# Patient Record
Sex: FEMALE | Race: BLACK OR AFRICAN AMERICAN | NOT HISPANIC OR LATINO | URBAN - METROPOLITAN AREA
[De-identification: names, ages, dates, MRNs, and addresses within clinical notes are randomized per-mention and may not be internally consistent; named-entity substitution may affect disease eponyms.]

---

## 2018-08-17 ENCOUNTER — EMERGENCY (EMERGENCY)
Facility: HOSPITAL | Age: 38
LOS: 0 days | Discharge: HOME | End: 2018-08-17
Attending: EMERGENCY MEDICINE | Admitting: EMERGENCY MEDICINE
Payer: MEDICAID

## 2018-08-17 VITALS
OXYGEN SATURATION: 100 % | SYSTOLIC BLOOD PRESSURE: 122 MMHG | RESPIRATION RATE: 16 BRPM | TEMPERATURE: 98 F | DIASTOLIC BLOOD PRESSURE: 90 MMHG | HEART RATE: 70 BPM

## 2018-08-17 VITALS — HEIGHT: 67 IN | WEIGHT: 125 LBS

## 2018-08-17 DIAGNOSIS — Z76.0 ENCOUNTER FOR ISSUE OF REPEAT PRESCRIPTION: ICD-10-CM

## 2018-08-17 DIAGNOSIS — F41.9 ANXIETY DISORDER, UNSPECIFIED: ICD-10-CM

## 2018-08-17 DIAGNOSIS — F32.9 MAJOR DEPRESSIVE DISORDER, SINGLE EPISODE, UNSPECIFIED: ICD-10-CM

## 2018-08-17 DIAGNOSIS — F25.9 SCHIZOAFFECTIVE DISORDER, UNSPECIFIED: ICD-10-CM

## 2018-08-17 PROCEDURE — 99282 EMERGENCY DEPT VISIT SF MDM: CPT

## 2018-08-17 NOTE — ED STATDOCS - OBJECTIVE STATEMENT
39 y/o female with a PMHx of anxiety, depression, and schizoaffective disorder presents to the ED requesting medication refill of Haldol, unknown dose. Pt states she has not refilled her prescription 4-6 months ago. She typically has her medication filled by her psychiatrist and has never refilled through [facility]. Pt denies any symptoms at this time.

## 2018-08-17 NOTE — ED STATDOCS - ATTENDING CONTRIBUTION TO CARE
I, Jw Lopez, performed the initial face to face bedside interview with this patient regarding history of present illness, review of symptoms and relevant past medical, social and family history.  I completed an independent physical examination.  I was the initial provider who evaluated this patient. I have signed out the follow up of any pending tests (i.e. labs, radiological studies) to the ACP.  I have communicated the patient’s plan of care and disposition with the ACP.  The history, relevant review of systems, past medical and surgical history, medical decision making, and physical examination was documented by the scribe in my presence and I attest to the accuracy of the documentation.

## 2018-08-26 ENCOUNTER — EMERGENCY (EMERGENCY)
Facility: HOSPITAL | Age: 38
LOS: 0 days | Discharge: HOME | End: 2018-08-26
Attending: EMERGENCY MEDICINE | Admitting: EMERGENCY MEDICINE
Payer: MEDICAID

## 2018-08-26 VITALS
RESPIRATION RATE: 16 BRPM | HEART RATE: 60 BPM | DIASTOLIC BLOOD PRESSURE: 85 MMHG | TEMPERATURE: 98 F | SYSTOLIC BLOOD PRESSURE: 126 MMHG | OXYGEN SATURATION: 100 %

## 2018-08-26 VITALS — WEIGHT: 130.07 LBS | HEIGHT: 67 IN

## 2018-08-26 DIAGNOSIS — R21 RASH AND OTHER NONSPECIFIC SKIN ERUPTION: ICD-10-CM

## 2018-08-26 DIAGNOSIS — F25.9 SCHIZOAFFECTIVE DISORDER, UNSPECIFIED: ICD-10-CM

## 2018-08-26 DIAGNOSIS — F32.9 MAJOR DEPRESSIVE DISORDER, SINGLE EPISODE, UNSPECIFIED: ICD-10-CM

## 2018-08-26 DIAGNOSIS — L29.9 PRURITUS, UNSPECIFIED: ICD-10-CM

## 2018-08-26 DIAGNOSIS — F41.9 ANXIETY DISORDER, UNSPECIFIED: ICD-10-CM

## 2018-08-26 PROCEDURE — 99283 EMERGENCY DEPT VISIT LOW MDM: CPT

## 2018-08-26 RX ADMIN — Medication 60 MILLIGRAM(S): at 15:32

## 2018-08-26 NOTE — ED STATDOCS - PROGRESS NOTE DETAILS
signed Rhea Ram PA-C Pt seen initially in intake by Dr. Butler. signed Rhea Ram PA-C Pt seen initially in intake by Dr. Butler.  Pt c/o itchy rash from mosquito bites or poison ivy on her abdomen and back. Pt states she has had this for about 1 month, notes she had gone to urinate in a [place]ed area near a supermarket and fell into some plants that may have been poison ivy, though this was after her symptoms had already started. No travel or sick contacts. Pt has scarce scattered small erythametous macules on trunk. No vesicles, petichiae, or crusting. Rash of unclear etiology. recommend outpt f/u derm. rx medrol dosepak. Pt feeling well, agrees with DC and plan of care.

## 2018-08-26 NOTE — ED ADULT NURSE NOTE - NSIMPLEMENTINTERV_GEN_ALL_ED
Implemented All Universal Safety Interventions:  [place] to call system. Call bell, personal items and telephone within reach. Instruct patient to call for assistance. Room bathroom lighting operational. Non-slip footwear when patient is off stretcher. Physically safe environment: no spills, clutter or unnecessary equipment. Stretcher in lowest position, wheels locked, appropriate side rails in place.

## 2018-08-26 NOTE — ED ADULT TRIAGE NOTE - CHIEF COMPLAINT QUOTE
as per pt " I'm getting bit by mosquitos so I need something for that and I might have some poison ivy so I need something for that as well"

## 2018-08-26 NOTE — ED STATDOCS - OBJECTIVE STATEMENT
39 y/o F with no relevant PMHx presents to the ED c/o rash and itchiness to abd and back. Pt believes they are from mosquitoes bites mixed with poison ivy. Pt notes itchiness to sites. NAD.

## 2018-10-08 ENCOUNTER — EMERGENCY (EMERGENCY)
Facility: HOSPITAL | Age: 38
LOS: 0 days | Discharge: HOME | End: 2018-10-08
Attending: EMERGENCY MEDICINE | Admitting: EMERGENCY MEDICINE
Payer: MEDICAID

## 2018-10-08 VITALS
SYSTOLIC BLOOD PRESSURE: 123 MMHG | HEART RATE: 69 BPM | TEMPERATURE: 99 F | DIASTOLIC BLOOD PRESSURE: 81 MMHG | OXYGEN SATURATION: 97 % | RESPIRATION RATE: 17 BRPM

## 2018-10-08 VITALS — HEIGHT: 67 IN | WEIGHT: 125 LBS

## 2018-10-08 DIAGNOSIS — F41.9 ANXIETY DISORDER, UNSPECIFIED: ICD-10-CM

## 2018-10-08 DIAGNOSIS — R05 COUGH: ICD-10-CM

## 2018-10-08 DIAGNOSIS — R50.9 FEVER, UNSPECIFIED: ICD-10-CM

## 2018-10-08 DIAGNOSIS — F17.200 NICOTINE DEPENDENCE, UNSPECIFIED, UNCOMPLICATED: ICD-10-CM

## 2018-10-08 DIAGNOSIS — J45.901 UNSPECIFIED ASTHMA WITH (ACUTE) EXACERBATION: ICD-10-CM

## 2018-10-08 DIAGNOSIS — F32.9 MAJOR DEPRESSIVE DISORDER, SINGLE EPISODE, UNSPECIFIED: ICD-10-CM

## 2018-10-08 DIAGNOSIS — F25.9 SCHIZOAFFECTIVE DISORDER, UNSPECIFIED: ICD-10-CM

## 2018-10-08 PROCEDURE — 99284 EMERGENCY DEPT VISIT MOD MDM: CPT | Mod: 25

## 2018-10-08 RX ORDER — IPRATROPIUM/ALBUTEROL SULFATE 18-103MCG
3 AEROSOL WITH ADAPTER (GRAM) INHALATION
Qty: 0 | Refills: 0 | Status: COMPLETED | OUTPATIENT
Start: 2018-10-08 | End: 2018-10-08

## 2018-10-08 RX ORDER — ALBUTEROL 90 UG/1
2 AEROSOL, METERED ORAL
Qty: 1 | Refills: 0 | OUTPATIENT
Start: 2018-10-08

## 2018-10-08 RX ORDER — AZITHROMYCIN 500 MG/1
1 TABLET, FILM COATED ORAL
Qty: 4 | Refills: 0 | OUTPATIENT
Start: 2018-10-08 | End: 2018-10-11

## 2018-10-08 RX ORDER — AZITHROMYCIN 500 MG/1
500 TABLET, FILM COATED ORAL ONCE
Qty: 0 | Refills: 0 | Status: COMPLETED | OUTPATIENT
Start: 2018-10-08 | End: 2018-10-08

## 2018-10-08 RX ADMIN — AZITHROMYCIN 500 MILLIGRAM(S): 500 TABLET, FILM COATED ORAL at 15:17

## 2018-10-08 RX ADMIN — Medication 3 MILLILITER(S): at 15:18

## 2018-10-08 RX ADMIN — Medication 3 MILLILITER(S): at 15:05

## 2018-10-08 RX ADMIN — Medication 60 MILLIGRAM(S): at 15:17

## 2018-10-08 RX ADMIN — Medication 3 MILLILITER(S): at 15:00

## 2018-10-08 NOTE — ED STATDOCS - OBJECTIVE STATEMENT
39 y/o female with a PMHx of bronchitis presents to the ED c/o productive cough x1 week. Pt states she has had intermittent subjective fevers. Pt currently does not have an inhaler. Pt Dx with bronchitis. NKDA. Has IUD. No PMD. Smoker.

## 2018-10-08 NOTE — ED STATDOCS - PROGRESS NOTE DETAILS
39 y/o F with PMH of bronchitis, presents with productive cough with green sputum and intermittent subjective fever x 1 week. Has not used any medications at home. States symptoms are similar to when she's had bronchitis in the past. Had inhaler for home use apx 6 months ago, but it has since run out. Admits to being a current smoker. 37 y/o F with PMH of bronchitis, presents with productive cough with green sputum and intermittent subjective fever x 1 week. Has not used any medications at home. States symptoms are similar to when she's had bronchitis in the past. Had inhaler for home use apx 6 months ago, but it has since run out. No recent travel. No sick contacts. No significant unexpected weight loss. No exposure to crowded areas such as hospitals, schools, prisons, nursing homes. Admits to being a current smoker. PE: NAD. Cardiac: s1/s2, RRR. Lungs: mild expiratory wheezing left lung base. No LE edema. A/P: URI, duonebs, prednisone, azithromycin. Likely d/c home. - Jun San PA-C

## 2018-11-01 ENCOUNTER — EMERGENCY (EMERGENCY)
Facility: HOSPITAL | Age: 38
LOS: 0 days | Discharge: HOME | End: 2018-11-01
Attending: EMERGENCY MEDICINE | Admitting: EMERGENCY MEDICINE
Payer: MEDICAID

## 2018-11-01 VITALS
TEMPERATURE: 98 F | DIASTOLIC BLOOD PRESSURE: 91 MMHG | SYSTOLIC BLOOD PRESSURE: 132 MMHG | RESPIRATION RATE: 17 BRPM | HEART RATE: 71 BPM | OXYGEN SATURATION: 98 %

## 2018-11-01 VITALS — WEIGHT: 125 LBS | HEIGHT: 67 IN

## 2018-11-01 DIAGNOSIS — F17.200 NICOTINE DEPENDENCE, UNSPECIFIED, UNCOMPLICATED: ICD-10-CM

## 2018-11-01 DIAGNOSIS — J45.909 UNSPECIFIED ASTHMA, UNCOMPLICATED: ICD-10-CM

## 2018-11-01 DIAGNOSIS — F32.9 MAJOR DEPRESSIVE DISORDER, SINGLE EPISODE, UNSPECIFIED: ICD-10-CM

## 2018-11-01 DIAGNOSIS — Z76.0 ENCOUNTER FOR ISSUE OF REPEAT PRESCRIPTION: ICD-10-CM

## 2018-11-01 DIAGNOSIS — Z79.51 LONG TERM (CURRENT) USE OF INHALED STEROIDS: ICD-10-CM

## 2018-11-01 DIAGNOSIS — F25.9 SCHIZOAFFECTIVE DISORDER, UNSPECIFIED: ICD-10-CM

## 2018-11-01 DIAGNOSIS — F41.9 ANXIETY DISORDER, UNSPECIFIED: ICD-10-CM

## 2018-11-01 PROCEDURE — 99283 EMERGENCY DEPT VISIT LOW MDM: CPT

## 2018-11-01 RX ORDER — ALBUTEROL 90 UG/1
2 AEROSOL, METERED ORAL
Qty: 1 | Refills: 0 | OUTPATIENT
Start: 2018-11-01 | End: 2018-11-07

## 2018-11-01 NOTE — ED ADULT NURSE NOTE - CHPI ED NUR SYMPTOMS NEG
no fever/no pain/no nausea/no dizziness/no weakness/no chills/no decreased eating/drinking/no tingling/no vomiting

## 2018-11-01 NOTE — ED STATDOCS - PROGRESS NOTE DETAILS
[place] VINCENT for ED attending, Dr. Hammer: Offered pt nebulizer treatment. Pt declines. Discussed with pt, can get flu shot at Pharmacy. 38 yr. old female PMH: Asthma presents to ED with chest tightness and running out of Albuterol Inhaler. Seen and examined by attending in intake. Plan: Pro Air Inhaler and Rx. Prednisone. Will F/U with results and re evaluate. Ariana ESTRELLA [place] [facility]ENT for ED attending, Dr. Hammer: Offered pt nebulizer treatment and steroids. Pt declines. Discussed with pt, can get flu shot at Pharmacy.

## 2018-11-01 NOTE — ED ADULT NURSE NOTE - NSIMPLEMENTINTERV_GEN_ALL_ED
Implemented All Universal Safety Interventions:  [place] to call system. Call bell, personal items and telephone within reach. Instruct patient to call for assistance. Room bathroom lighting operational. Non-slip footwear when patient is off stretcher. Physically safe environment: no spills, clutter or unnecessary equipment. Stretcher in lowest position, wheels locked, appropriate side rails in place.

## 2018-11-01 NOTE — ED STATDOCS - OBJECTIVE STATEMENT
39 y/o F with PMHx of Anxiety, Depression, Schizoaffective Disorder, and Asthma presenting to the ED for refill of Albuterol inhaler and to receive flu shot. +chest tightness. States that her Albuterol inhaler prescription is almost done and she needs a refill. Hx of hospitalization secondary to asthma, no hx of intubation. Denies any SOB, fevers, chills, abd pain, N/V/D. Pharmacy: [facility]. Smoker, 0.5ppd. NKDA.

## 2018-11-01 NOTE — ED STATDOCS - ATTENDING CONTRIBUTION TO CARE
I, Mana Hammer MD,  performed the initial face to face bedside interview with this patient regarding history of present illness, review of symptoms and relevant past medical, social and family history.  I completed an independent physical examination.  I was the initial provider who evaluated this patient. I have signed out the follow up of any pending tests (i.e. labs, radiological studies) to the ACP.  I have communicated the patient’s plan of care and disposition with the ACP.  The history, relevant review of systems, past medical and surgical history, medical decision making, and physical examination was documented by the scribe in my presence and I attest to the accuracy of the documentation.

## 2018-11-01 NOTE — ED ADULT NURSE NOTE - OBJECTIVE STATEMENT
presents to ed needing renewal for albuterol prescription. patient currently not in any distress. currently comfortable. will continue to monitor for safety and comfort.

## 2019-01-30 ENCOUNTER — EMERGENCY (EMERGENCY)
Facility: HOSPITAL | Age: 39
LOS: 0 days | Discharge: HOME | End: 2019-01-30
Attending: STUDENT IN AN ORGANIZED HEALTH CARE EDUCATION/TRAINING PROGRAM | Admitting: STUDENT IN AN ORGANIZED HEALTH CARE EDUCATION/TRAINING PROGRAM
Payer: MEDICAID

## 2019-01-30 VITALS — HEIGHT: 67 IN

## 2019-01-30 VITALS
DIASTOLIC BLOOD PRESSURE: 93 MMHG | OXYGEN SATURATION: 100 % | HEART RATE: 74 BPM | RESPIRATION RATE: 16 BRPM | SYSTOLIC BLOOD PRESSURE: 133 MMHG | WEIGHT: 138.01 LBS | HEIGHT: 67 IN | TEMPERATURE: 99 F

## 2019-01-30 DIAGNOSIS — L02.412 CUTANEOUS ABSCESS OF LEFT AXILLA: ICD-10-CM

## 2019-01-30 DIAGNOSIS — L02.413 CUTANEOUS ABSCESS OF RIGHT UPPER LIMB: ICD-10-CM

## 2019-01-30 DIAGNOSIS — L02.433: ICD-10-CM

## 2019-01-30 DIAGNOSIS — L02.422 FURUNCLE OF LEFT AXILLA: ICD-10-CM

## 2019-01-30 PROCEDURE — 99284 EMERGENCY DEPT VISIT MOD MDM: CPT

## 2019-01-30 RX ORDER — CEPHALEXIN 500 MG
500 CAPSULE ORAL ONCE
Qty: 0 | Refills: 0 | Status: COMPLETED | OUTPATIENT
Start: 2019-01-30 | End: 2019-01-30

## 2019-01-30 RX ORDER — CEPHALEXIN 500 MG
1 CAPSULE ORAL
Qty: 28 | Refills: 0 | OUTPATIENT
Start: 2019-01-30 | End: 2019-02-05

## 2019-01-30 RX ORDER — AZTREONAM 2 G
1 VIAL (EA) INJECTION
Qty: 14 | Refills: 0 | OUTPATIENT
Start: 2019-01-30 | End: 2019-02-05

## 2019-01-30 RX ADMIN — Medication 1 TABLET(S): at 15:40

## 2019-01-30 RX ADMIN — Medication 500 MILLIGRAM(S): at 15:40

## 2019-01-30 NOTE — ED STATDOCS - OBJECTIVE STATEMENT
37 y/o female with a PMHx of anxiety, depression presents to the ED c/o right forearm, left axilla abscesses for a few days. No fever, chills or any other acute complaints at this time. Pharmacy: [facility] in [place]. KEVIN.

## 2019-01-30 NOTE — ED ADULT NURSE NOTE - CHIEF COMPLAINT QUOTE
patient reports boil on r forearm and bilateral axilla. c/o discomfort at site.   was seen in [facility] 1/2-1/14/2019  evaluated for stds negative.  denies fever or chills

## 2019-01-30 NOTE — ED STATDOCS - SKIN, MLM
+1cm abscess to the dorsum of the right wrist and left axilla. No surrounding erythema, no warmth, non fluctuant.

## 2019-01-30 NOTE — ED ADULT TRIAGE NOTE - CHIEF COMPLAINT QUOTE
patient reports boil on r forearm and bilateral axilla. c/o discomfort at site.   was seen in [facility] 1/2-1/14/2019  evaluated for stds negative.  denies fever or chills

## 2019-01-30 NOTE — ED STATDOCS - ATTENDING CONTRIBUTION TO CARE
I, Justine Tristan DO,  performed the initial face to face bedside interview with this patient regarding history of present illness, review of symptoms and relevant past medical, social and family history.  I completed an independent physical examination.  I was the initial provider who evaluated this patient. I have signed out the follow up of any pending tests (i.e. labs, radiological studies) to the ACP.  I have communicated the patient’s plan of care and disposition with the ACP.  The history, relevant review of systems, past medical and surgical history, medical decision making, and physical examination was documented by the scribe in my presence and I attest to the accuracy of the documentation.

## 2019-01-30 NOTE — ED STATDOCS - PROGRESS NOTE DETAILS
38 yr. old female Azalea DO: Abscesses not fluctuant at this time; antibiotics; warm compresses; f/u with pmd in 1-2 days without fail; strict return precautions given.

## 2019-02-01 ENCOUNTER — OUTPATIENT (OUTPATIENT)
Dept: OUTPATIENT SERVICES | Facility: HOSPITAL | Age: 39
LOS: 1 days | End: 2019-02-01
Payer: MEDICAID

## 2019-02-01 PROCEDURE — G9001: CPT

## 2019-02-05 DIAGNOSIS — Z71.89 OTHER SPECIFIED COUNSELING: ICD-10-CM

## 2019-02-07 ENCOUNTER — APPOINTMENT (OUTPATIENT)
Dept: DERMATOLOGY | Facility: CLINIC | Age: 39
End: 2019-02-07
Payer: MEDICAID

## 2019-02-07 VITALS — BODY MASS INDEX: 22.76 KG/M2 | WEIGHT: 145 LBS | HEIGHT: 67 IN

## 2019-02-07 DIAGNOSIS — Z86.59 PERSONAL HISTORY OF OTHER MENTAL AND BEHAVIORAL DISORDERS: ICD-10-CM

## 2019-02-07 DIAGNOSIS — Z84.0 FAMILY HISTORY OF DISEASES OF THE SKIN AND SUBCUTANEOUS TISSUE: ICD-10-CM

## 2019-02-07 DIAGNOSIS — L70.0 ACNE VULGARIS: ICD-10-CM

## 2019-02-07 DIAGNOSIS — F17.200 NICOTINE DEPENDENCE, UNSPECIFIED, UNCOMPLICATED: ICD-10-CM

## 2019-02-07 DIAGNOSIS — Z78.9 OTHER SPECIFIED HEALTH STATUS: ICD-10-CM

## 2019-02-07 PROCEDURE — 99203 OFFICE O/P NEW LOW 30 MIN: CPT

## 2019-02-07 RX ORDER — OLANZAPINE 10 MG/1
10 TABLET, FILM COATED ORAL
Refills: 0 | Status: ACTIVE | COMMUNITY

## 2019-02-07 NOTE — ASSESSMENT
[FreeTextEntry1] : Acne\par Tretinoin 0.025% cream qhs\par Glyderm mask weekly.\par f/u in 4 months.

## 2019-02-07 NOTE — HISTORY OF PRESENT ILLNESS
[FreeTextEntry1] : Acne. [de-identified] : She c/o blackheads, diffusely on the face.  Present for 20 years.  Exacerbating/remitting.  No current tx, but no response to OTC's in past.

## 2019-02-07 NOTE — PHYSICAL EXAM
[Alert] : alert [Oriented x 3] : ~L oriented x 3 [Well Nourished] : well nourished [Declined] : declined [Eyelids] : Eyelids [Ears] : Ears [Lips] : Lips [Neck] : Neck [FreeTextEntry3] : Mild acneiform eruption of the cheeks, minimal erythema.

## 2019-02-20 ENCOUNTER — RX RENEWAL (OUTPATIENT)
Age: 39
End: 2019-02-20

## 2019-02-22 ENCOUNTER — RX RENEWAL (OUTPATIENT)
Age: 39
End: 2019-02-22

## 2019-02-25 RX ORDER — TRETINOIN 0.25 MG/G
0.03 CREAM TOPICAL
Qty: 1 | Refills: 2 | Status: ACTIVE | COMMUNITY
Start: 2019-02-07

## 2019-04-09 NOTE — ED ADULT NURSE NOTE - CAS TRG GEN SKIN COLOR
Detail Level: Zone Otc Regimen: Antibiotic ointment and/or vaseline on gauze or non stick dressing. Normal for race

## 2019-06-06 ENCOUNTER — EMERGENCY (EMERGENCY)
Facility: HOSPITAL | Age: 39
LOS: 0 days | Discharge: HOME | End: 2019-06-06
Attending: STUDENT IN AN ORGANIZED HEALTH CARE EDUCATION/TRAINING PROGRAM | Admitting: STUDENT IN AN ORGANIZED HEALTH CARE EDUCATION/TRAINING PROGRAM
Payer: MEDICAID

## 2019-06-06 VITALS
TEMPERATURE: 98 F | DIASTOLIC BLOOD PRESSURE: 90 MMHG | RESPIRATION RATE: 18 BRPM | SYSTOLIC BLOOD PRESSURE: 116 MMHG | OXYGEN SATURATION: 97 % | HEART RATE: 84 BPM

## 2019-06-06 VITALS — HEIGHT: 67 IN | WEIGHT: 125 LBS

## 2019-06-06 DIAGNOSIS — F41.9 ANXIETY DISORDER, UNSPECIFIED: ICD-10-CM

## 2019-06-06 DIAGNOSIS — F32.9 MAJOR DEPRESSIVE DISORDER, SINGLE EPISODE, UNSPECIFIED: ICD-10-CM

## 2019-06-06 DIAGNOSIS — Z79.899 OTHER LONG TERM (CURRENT) DRUG THERAPY: ICD-10-CM

## 2019-06-06 DIAGNOSIS — R06.02 SHORTNESS OF BREATH: ICD-10-CM

## 2019-06-06 DIAGNOSIS — J45.901 UNSPECIFIED ASTHMA WITH (ACUTE) EXACERBATION: ICD-10-CM

## 2019-06-06 DIAGNOSIS — Z76.0 ENCOUNTER FOR ISSUE OF REPEAT PRESCRIPTION: ICD-10-CM

## 2019-06-06 DIAGNOSIS — F25.9 SCHIZOAFFECTIVE DISORDER, UNSPECIFIED: ICD-10-CM

## 2019-06-06 PROCEDURE — 99284 EMERGENCY DEPT VISIT MOD MDM: CPT

## 2019-06-06 RX ORDER — ALBUTEROL 90 UG/1
2 AEROSOL, METERED ORAL
Qty: 1 | Refills: 0
Start: 2019-06-06

## 2019-06-06 NOTE — ED STATDOCS - CLINICAL SUMMARY MEDICAL DECISION MAKING FREE TEXT BOX
39 y/o female with hx of asthma, requesting refill of Albuterol. Will provide rx, f/u with PMD without fail.

## 2019-06-06 NOTE — ED STATDOCS - CHPI ED QUALITY
ALTERATION IN BREATHING DISPLAY PLAN FREE TEXT DISPLAY PLAN FREE TEXT DISPLAY PLAN FREE TEXT DISPLAY PLAN FREE TEXT DISPLAY PLAN FREE TEXT

## 2019-06-06 NOTE — ED STATDOCS - NSFOLLOWUPINSTRUCTIONS_ED_ALL_ED_FT
Asthma    WHAT YOU NEED TO KNOW:    Asthma is a lung disease that makes breathing difficult. Chronic inflammation and reactions to triggers narrow the airways in the lungs. Asthma can become life-threatening if it is not managed.          DISCHARGE INSTRUCTIONS:    Call your local emergency number (911 in the [place]) if:     You have severe shortness of breath.       Your lips or nails turn blue or gray.       The skin around your neck and ribs pulls in with each breath.      You have shortness of breath, even after you take your short-term medicine as directed.       Your peak flow numbers are in the red zone of your AAP.     Call your doctor if:     You run out of medicine before your next refill is due.      Your symptoms get worse.       You need to take more medicine than usual to control your symptoms.       You have questions or concerns about your condition or care.    Medicines:     Medicines decrease inflammation, open airways, and make it easier to breathe. Medicines may be inhaled, taken as a pill, or injected. Short-term medicines relieve your symptoms quickly. Long-term medicines are used to prevent future attacks. You may also need medicine to help control your allergies. Ask your healthcare provider for more information about the medicine you are given and how to take it safely.      Take your medicine as directed. Contact your healthcare provider if you think your medicine is not helping or if you have side effects. Tell him of her if you are allergic to any medicine. Keep a list of the medicines, vitamins, and herbs you take. Include the amounts, and when and why you take them. Bring the list or the pill bottles to follow-up visits. Carry your medicine list with you in case of an emergency.    Follow up with your healthcare provider as directed: You will need to return to make sure your medicine is working and your symptoms are controlled. You may be referred to an asthma specialist. You may be asked to keep a record of your peak flow values and bring it with you to your appointments. Write down your questions so you remember to ask them during your visits.    Manage your symptoms and prevent future attacks:     Follow your Asthma Action Plan (AAP). This is a written plan that you and your healthcare provider create. It explains which medicine you need and when to change doses if necessary. It also explains how you can monitor symptoms and use a peak flow meter. The meter measures how well your lungs are working.       Manage other health conditions, such as allergies, acid reflux, and sleep apnea.       Identify and avoid triggers. These may include pets, dust mites, mold, and cockroaches.           Do not smoke or be around others who smoke. Nicotine and other chemicals in cigarettes and cigars can cause lung damage. Ask your healthcare provider for information if you currently smoke and need help to quit. E-cigarettes or smokeless tobacco still contain nicotine. Talk to your healthcare provider before you use these products.       Ask about the flu vaccine. The flu can make your asthma worse. You may need a yearly flu shot.

## 2019-06-06 NOTE — ED STATDOCS - PROGRESS NOTE DETAILS
Asymptomatic at this time.  Will give rx for new inhaler and she will follow up PMD -Angela Ferrell PA-C Azalea DO: Offered albuterol nebulizer but refusing; just wanting rx and to go home; strict return precautions given.

## 2019-06-06 NOTE — ED STATDOCS - OBJECTIVE STATEMENT
39 y/o female with PMHx of anxiety, depression, schizoaffective disorder, asthma presents to the ED c/o asthma exacerbation. Pt ran out of her Albuterol for her inhaler and is requesting refill. No fever, chills, n/v/d, or any other acute complaints at this time.

## 2019-06-06 NOTE — ED ADULT TRIAGE NOTE - CHIEF COMPLAINT QUOTE
Patient reports she had shortness of breath and asthma exacerbation approximately 2 hours ago. Patient took home inhaler but then ran out. states she needs a prescription for a new inhaler because she does not want to have another exacerbation and not have a rescue inhaler.

## 2020-02-18 ENCOUNTER — EMERGENCY (EMERGENCY)
Facility: HOSPITAL | Age: 40
LOS: 0 days | Discharge: HOME | End: 2020-02-18
Attending: EMERGENCY MEDICINE
Payer: MEDICAID

## 2020-02-18 VITALS
TEMPERATURE: 98 F | SYSTOLIC BLOOD PRESSURE: 140 MMHG | DIASTOLIC BLOOD PRESSURE: 92 MMHG | RESPIRATION RATE: 18 BRPM | OXYGEN SATURATION: 100 % | HEART RATE: 87 BPM

## 2020-02-18 VITALS — WEIGHT: 125 LBS | HEIGHT: 67 IN

## 2020-02-18 DIAGNOSIS — J45.909 UNSPECIFIED ASTHMA, UNCOMPLICATED: ICD-10-CM

## 2020-02-18 DIAGNOSIS — F25.9 SCHIZOAFFECTIVE DISORDER, UNSPECIFIED: ICD-10-CM

## 2020-02-18 DIAGNOSIS — F41.9 ANXIETY DISORDER, UNSPECIFIED: ICD-10-CM

## 2020-02-18 DIAGNOSIS — F32.9 MAJOR DEPRESSIVE DISORDER, SINGLE EPISODE, UNSPECIFIED: ICD-10-CM

## 2020-02-18 DIAGNOSIS — Z76.0 ENCOUNTER FOR ISSUE OF REPEAT PRESCRIPTION: ICD-10-CM

## 2020-02-18 PROCEDURE — 99282 EMERGENCY DEPT VISIT SF MDM: CPT

## 2020-02-18 PROCEDURE — 99281 EMR DPT VST MAYX REQ PHY/QHP: CPT

## 2020-02-18 RX ORDER — ALBUTEROL 90 UG/1
2 AEROSOL, METERED ORAL
Qty: 1 | Refills: 0
Start: 2020-02-18

## 2020-02-18 NOTE — ED STATDOCS - CLINICAL SUMMARY MEDICAL DECISION MAKING FREE TEXT BOX
Pt here for med refill. lungs CTAB with normal O2. Will provide script. Pt here for med refill. lungs CTAB with normal O2.  No active exacerbation.  Declines any breathing treatment here and just needs meds for home. Will provide script.  Encouraged PMD f/u.

## 2020-02-18 NOTE — ED STATDOCS - PATIENT PORTAL LINK FT
You can access the FollowMyHealth Patient Portal offered by [facility] by registering at the following website: [email or web address]. By joining [facility]’s FollowMyHealth portal, you will also be able to view your health information using other applications (apps) compatible with our system.

## 2020-02-18 NOTE — ED STATDOCS - NSFOLLOWUPINSTRUCTIONS_ED_ALL_ED_FT
Medicine Refill at the Emergency Department  We have refilled your medicine today. However, it is best for you to get refills through your primary health care provider's office. In the future, please plan ahead so you do not need to get refills from the emergency department.  If we refilled a medicine that you take daily for a chronic problem (maintenance medicine), you may have received only enough to get you by until you are able to see your regular health care provider.  This information is not intended to replace advice given to you by your health care provider. Make sure you discuss any questions you have with your health care provider.

## 2020-02-18 NOTE — ED STATDOCS - PROGRESS NOTE DETAILS
Patient seen and evaluated with ED attending at intake.  Well appearing, speaking in full, clear sentences.  No respiratory distress.  Ran out of inhaler 1 month ago, has not followed up with PMD but states she does have one.  Just would like inhaler in case she needs it, does state feeling slightly short of breath at this time but declines nebulizer treatment and would just like inhaler sent to pharm.  Lungs CTAB. -Angela Ferrell PA-C
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
## 2020-03-03 ENCOUNTER — APPOINTMENT (OUTPATIENT)
Dept: MAMMOGRAPHY | Facility: CLINIC | Age: 40
End: 2020-03-03

## 2021-03-06 NOTE — ED STATDOCS - CPE ED RESP NORM
normal... Zygomaticofacial Flap Text: Given the location of the defect, shape of the defect and the proximity to free margins a zygomaticofacial flap was deemed most appropriate for repair.  Using a sterile surgical marker, the appropriate flap was drawn incorporating the defect and placing the expected incisions within the relaxed skin tension lines where possible. The area thus outlined was incised deep to adipose tissue with a #15 scalpel blade with preservation of a vascular pedicle.  The skin margins were undermined to an appropriate distance in all directions utilizing iris scissors.  The flap was then placed into the defect and anchored with interrupted buried subcutaneous sutures.

## 2023-01-31 NOTE — ED ADULT TRIAGE NOTE - HEIGHT IN INCHES
No anemia/ infection. Normal kidney/ liver function. Potassium level slightly low. Will watch. Blood sugar slightly elevated- will monitor for now. If still elevated next year will need to screen you for diabetes. Cholesterol is slightly worse from last year. Still need to consider cholesterol medication but it is still your decision. No protein in your urine. 7

## 2023-03-03 NOTE — ED ADULT NURSE NOTE - NS ED NURSE DISCH DISPOSITION
Goal Outcome Evaluation:  Plan of Care Reviewed With: patient           Outcome Evaluation: Pt is ambulating well with Rwx despite pain. Demonstrated independence with HEP. Has a stair lift so denies a need to practice stairs.  Pt is safe to d/c home when medically stable.PT will sign off.   Discharged

## 2023-06-12 NOTE — ED STATDOCS - EXITCARE/DISCHARGE INSTRUCTIONS
Pt would like order for blood work released so she can go to lab in [place].    Launch [employer] and print the 'Prescriptions from this Visit' Report

## 2024-03-04 NOTE — ED STATDOCS - CONSTITUTIONAL NEGATIVE STATEMENT, MLM
Everything was printed and put in the  bin  
Pt states she saw the provider last week and left all her paperwork at the office, ( AVS, blood work). She would like to have that all printed out and she will come by Wednesday to pick it up. Please advise.   
no fever and no chills.

## 2025-07-11 NOTE — ED ADULT NURSE NOTE - NSSISCREENINGQ1_ED_A_ED
Large Joint Aspiration/Injection: R knee    Date/Time: 7/10/2025 3:15 PM    Performed by: Mio Pittman MD  Authorized by: Mio Pittman MD    Consent Done?:  Yes (Verbal)  Indications:  Arthritis  Timeout: prior to procedure the correct patient, procedure, and site was verified    Prep: patient was prepped and draped in usual sterile fashion      Details:  Needle Size:  22 G  Approach:  Anterolateral  Location:  Knee  Site:  R knee  Medications:  5 mL LIDOcaine (PF) 20 mg/mL (2%) 20 mg/mL (2 %); 12 mg betamethasone acetate-betamethasone sodium phosphate 6 mg/mL  Small Joint Aspiration/Injection: R thumb MCP    Date/Time: 7/10/2025 3:15 PM    Performed by: Mio Pittman MD  Authorized by: Mio Pittman MD    Consent Done?:  Yes (Verbal)  Indications:  Arthritis  Timeout: prior to procedure the correct patient, procedure, and site was verified    Prep: patient was prepped and draped in usual sterile fashion      Location:  Thumb  Site:  R thumb MCP  Needle size:  25 G  Approach:  Radial  Medications:  1 mL LIDOcaine HCL 20 mg/ml (2%) 20 mg/mL (2 %); 6 mg betamethasone acetate-betamethasone sodium phosphate 6 mg/mL  Patient tolerance:  Patient tolerated the procedure well with no immediate complications    
No